# Patient Record
Sex: FEMALE | Race: WHITE | NOT HISPANIC OR LATINO | Employment: OTHER | URBAN - METROPOLITAN AREA
[De-identification: names, ages, dates, MRNs, and addresses within clinical notes are randomized per-mention and may not be internally consistent; named-entity substitution may affect disease eponyms.]

---

## 2017-03-29 ENCOUNTER — AMBULATORY - HEALTHEAST (OUTPATIENT)
Dept: PHYSICAL MEDICINE AND REHAB | Facility: CLINIC | Age: 67
End: 2017-03-29

## 2017-03-30 ENCOUNTER — RECORDS - HEALTHEAST (OUTPATIENT)
Dept: ADMINISTRATIVE | Facility: OTHER | Age: 67
End: 2017-03-30

## 2017-03-30 ENCOUNTER — AMBULATORY - HEALTHEAST (OUTPATIENT)
Dept: PHYSICAL MEDICINE AND REHAB | Facility: CLINIC | Age: 67
End: 2017-03-30

## 2017-03-30 DIAGNOSIS — M54.12 RADICULITIS, CERVICAL: ICD-10-CM

## 2017-03-30 DIAGNOSIS — M54.2 CERVICALGIA: ICD-10-CM

## 2017-03-30 DIAGNOSIS — M79.2 NEURITIS: ICD-10-CM

## 2017-04-07 ENCOUNTER — HOSPITAL ENCOUNTER (OUTPATIENT)
Dept: PHYSICAL MEDICINE AND REHAB | Facility: CLINIC | Age: 67
Discharge: HOME | End: 2017-04-07
Attending: ORTHOPAEDIC SURGERY

## 2017-04-07 ENCOUNTER — HOSPITAL ENCOUNTER (OUTPATIENT)
Dept: PHYSICAL MEDICINE AND REHAB | Facility: CLINIC | Age: 67
Discharge: HOME | End: 2017-04-07
Attending: PHYSICIAN ASSISTANT

## 2017-04-07 DIAGNOSIS — M54.2 CERVICALGIA: ICD-10-CM

## 2017-04-07 DIAGNOSIS — M48.02 CERVICAL STENOSIS OF SPINAL CANAL: ICD-10-CM

## 2017-04-07 DIAGNOSIS — M79.18 MYOFASCIAL PAIN: ICD-10-CM

## 2017-04-07 DIAGNOSIS — M54.12 CERVICAL RADICULITIS: ICD-10-CM

## 2017-04-07 DIAGNOSIS — Z98.1 STATUS POST CERVICAL SPINAL FUSION: ICD-10-CM

## 2017-04-07 DIAGNOSIS — M54.2 NECK PAIN: ICD-10-CM

## 2017-04-07 DIAGNOSIS — M50.20 CERVICAL DISC HERNIATION: ICD-10-CM

## 2017-04-07 RX ORDER — LIDOCAINE 50 MG/G
1-2 PATCH TOPICAL DAILY PRN
Status: SHIPPED | COMMUNITY
Start: 2017-04-07

## 2017-04-07 RX ORDER — DULOXETIN HYDROCHLORIDE 20 MG/1
20 CAPSULE, DELAYED RELEASE ORAL DAILY
Status: SHIPPED | COMMUNITY
Start: 2017-04-07

## 2017-04-07 ASSESSMENT — MIFFLIN-ST. JEOR: SCORE: 2042.35

## 2017-04-09 ENCOUNTER — ANESTHESIA - HEALTHEAST (OUTPATIENT)
Dept: SURGERY | Facility: HOSPITAL | Age: 67
End: 2017-04-09

## 2017-04-10 ENCOUNTER — AMBULATORY - HEALTHEAST (OUTPATIENT)
Dept: PHYSICAL MEDICINE AND REHAB | Facility: CLINIC | Age: 67
End: 2017-04-10

## 2017-04-10 ENCOUNTER — SURGERY - HEALTHEAST (OUTPATIENT)
Dept: SURGERY | Facility: HOSPITAL | Age: 67
End: 2017-04-10

## 2017-04-10 ASSESSMENT — MIFFLIN-ST. JEOR: SCORE: 2044.96

## 2017-04-28 ASSESSMENT — MIFFLIN-ST. JEOR: SCORE: 2012.07

## 2017-05-01 ENCOUNTER — SURGERY - HEALTHEAST (OUTPATIENT)
Dept: SURGERY | Facility: HOSPITAL | Age: 67
End: 2017-05-01

## 2017-05-01 ENCOUNTER — ANESTHESIA - HEALTHEAST (OUTPATIENT)
Dept: SURGERY | Facility: HOSPITAL | Age: 67
End: 2017-05-01

## 2017-05-02 ASSESSMENT — MIFFLIN-ST. JEOR: SCORE: 2004.14

## 2017-05-11 ENCOUNTER — COMMUNICATION - HEALTHEAST (OUTPATIENT)
Dept: PHYSICAL MEDICINE AND REHAB | Facility: CLINIC | Age: 67
End: 2017-05-11

## 2017-05-11 DIAGNOSIS — G95.9 CERVICAL MYELOPATHY (H): ICD-10-CM

## 2017-05-16 ENCOUNTER — HOSPITAL ENCOUNTER (OUTPATIENT)
Dept: RADIOLOGY | Facility: HOSPITAL | Age: 67
Discharge: HOME | End: 2017-05-16
Attending: PHYSICIAN ASSISTANT

## 2017-05-16 ENCOUNTER — HOSPITAL ENCOUNTER (OUTPATIENT)
Dept: PHYSICAL MEDICINE AND REHAB | Facility: CLINIC | Age: 67
Discharge: HOME | End: 2017-05-16
Attending: PHYSICIAN ASSISTANT

## 2017-05-16 DIAGNOSIS — G95.9 CERVICAL MYELOPATHY (H): ICD-10-CM

## 2017-05-18 ENCOUNTER — COMMUNICATION - HEALTHEAST (OUTPATIENT)
Dept: PHYSICAL MEDICINE AND REHAB | Facility: CLINIC | Age: 67
End: 2017-05-18

## 2017-05-19 ENCOUNTER — COMMUNICATION - HEALTHEAST (OUTPATIENT)
Dept: PHYSICAL MEDICINE AND REHAB | Facility: CLINIC | Age: 67
End: 2017-05-19

## 2017-05-19 DIAGNOSIS — Z98.1 S/P CERVICAL SPINAL FUSION: ICD-10-CM

## 2017-05-23 ENCOUNTER — COMMUNICATION - HEALTHEAST (OUTPATIENT)
Dept: PHYSICAL MEDICINE AND REHAB | Facility: CLINIC | Age: 67
End: 2017-05-23

## 2017-05-23 ENCOUNTER — AMBULATORY - HEALTHEAST (OUTPATIENT)
Dept: PHYSICAL MEDICINE AND REHAB | Facility: CLINIC | Age: 67
End: 2017-05-23

## 2017-05-25 ENCOUNTER — COMMUNICATION - HEALTHEAST (OUTPATIENT)
Dept: PHYSICAL MEDICINE AND REHAB | Facility: CLINIC | Age: 67
End: 2017-05-25

## 2017-05-25 DIAGNOSIS — G95.9 CERVICAL MYELOPATHY (H): ICD-10-CM

## 2017-06-01 ENCOUNTER — COMMUNICATION - HEALTHEAST (OUTPATIENT)
Dept: PHYSICAL MEDICINE AND REHAB | Facility: CLINIC | Age: 67
End: 2017-06-01

## 2017-06-01 DIAGNOSIS — G95.9 CERVICAL MYELOPATHY (H): ICD-10-CM

## 2017-06-13 ENCOUNTER — HOSPITAL ENCOUNTER (OUTPATIENT)
Dept: PHYSICAL MEDICINE AND REHAB | Facility: CLINIC | Age: 67
Discharge: HOME | End: 2017-06-13
Attending: PHYSICIAN ASSISTANT

## 2017-06-13 ENCOUNTER — HOSPITAL ENCOUNTER (OUTPATIENT)
Dept: RADIOLOGY | Facility: HOSPITAL | Age: 67
Discharge: HOME | End: 2017-06-13
Attending: PHYSICIAN ASSISTANT

## 2017-06-13 DIAGNOSIS — Z98.1 STATUS POST CERVICAL SPINAL FUSION: ICD-10-CM

## 2017-06-13 DIAGNOSIS — G95.9 CERVICAL MYELOPATHY (H): ICD-10-CM

## 2017-06-13 RX ORDER — HYDROXYZINE PAMOATE 50 MG/1
CAPSULE ORAL
Status: SHIPPED | COMMUNITY
Start: 2017-06-13

## 2017-06-13 RX ORDER — IBUPROFEN 200 MG
1 CAPSULE ORAL 2 TIMES DAILY
Qty: 200 TABLET | Refills: 4 | Status: SHIPPED | OUTPATIENT
Start: 2017-06-13

## 2017-06-13 RX ORDER — OXYCODONE HYDROCHLORIDE 5 MG/1
TABLET ORAL
Status: SHIPPED | COMMUNITY
Start: 2017-06-13

## 2017-06-13 RX ORDER — ERGOCALCIFEROL 1.25 MG/1
50000 CAPSULE ORAL WEEKLY
Qty: 12 CAPSULE | Refills: 4 | Status: SHIPPED | OUTPATIENT
Start: 2017-06-13

## 2017-06-13 ASSESSMENT — MIFFLIN-ST. JEOR: SCORE: 2022.28

## 2017-07-10 ENCOUNTER — COMMUNICATION - HEALTHEAST (OUTPATIENT)
Dept: PHYSICAL MEDICINE AND REHAB | Facility: CLINIC | Age: 67
End: 2017-07-10

## 2017-07-12 ENCOUNTER — AMBULATORY - HEALTHEAST (OUTPATIENT)
Dept: PHYSICAL MEDICINE AND REHAB | Facility: CLINIC | Age: 67
End: 2017-07-12

## 2017-07-12 DIAGNOSIS — G89.18 POST-OPERATIVE PAIN: ICD-10-CM

## 2017-07-25 ENCOUNTER — HOSPITAL ENCOUNTER (OUTPATIENT)
Dept: RADIOLOGY | Facility: HOSPITAL | Age: 67
Discharge: HOME | End: 2017-07-25
Attending: PHYSICIAN ASSISTANT

## 2017-07-25 ENCOUNTER — COMMUNICATION - HEALTHEAST (OUTPATIENT)
Dept: TELEHEALTH | Facility: CLINIC | Age: 67
End: 2017-07-25

## 2017-07-25 ENCOUNTER — HOSPITAL ENCOUNTER (OUTPATIENT)
Dept: PHYSICAL MEDICINE AND REHAB | Facility: CLINIC | Age: 67
Discharge: HOME | End: 2017-07-25
Attending: PHYSICIAN ASSISTANT

## 2017-07-25 DIAGNOSIS — Z98.1 S/P CERVICAL SPINAL FUSION: ICD-10-CM

## 2017-07-25 DIAGNOSIS — Z98.1 STATUS POST CERVICAL SPINAL FUSION: ICD-10-CM

## 2017-08-03 ENCOUNTER — COMMUNICATION - HEALTHEAST (OUTPATIENT)
Dept: PHYSICAL MEDICINE AND REHAB | Facility: CLINIC | Age: 67
End: 2017-08-03

## 2017-08-11 ENCOUNTER — COMMUNICATION - HEALTHEAST (OUTPATIENT)
Dept: PHYSICAL MEDICINE AND REHAB | Facility: CLINIC | Age: 67
End: 2017-08-11

## 2021-05-30 VITALS — WEIGHT: 293 LBS | HEIGHT: 67 IN | BODY MASS INDEX: 45.99 KG/M2

## 2021-05-30 VITALS — WEIGHT: 293 LBS | HEIGHT: 68 IN | BODY MASS INDEX: 44.41 KG/M2

## 2021-05-30 VITALS — BODY MASS INDEX: 45.99 KG/M2 | WEIGHT: 293 LBS | HEIGHT: 67 IN

## 2021-05-31 VITALS — HEIGHT: 67 IN | WEIGHT: 293 LBS | BODY MASS INDEX: 45.99 KG/M2
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
## 2021-06-09 NOTE — PROGRESS NOTES
[facility] SPINE SURGERY SERVICE CONSULTATION NOTE    4/7/2017     Erika Marroquin is a 66 y.o. female who presents today with neck pain and rates her pain a 6-8/10, driving and activites aggravates her symptoms, alleviating factors include rest, c collar and lidocaine patches. She uses a front wheeled walker daily for the past few months. She has had a RAY in her cervical spine which did not help and feels that her balance is off, has performed PT for her neck in the past.     REVIEW OF SYSTEMS:  ROS reviewed with pt as documented on pt health form of 4/7/2017.    Negative cardiac, pulmonary, hematological.  No family hx of anesthetic reactions.  No family hx of hypercoagulability. Endorses cardiac arrythmia, HTN.        MEDICATIONS:  Current Outpatient Prescriptions   Medication Sig Dispense Refill     CALCIUM-VITAMIN D3 ORAL Take by mouth daily.       celecoxib (CELEBREX) 100 MG capsule Take 100 mg by mouth 2 (two) times a day.       DULoxetine (CYMBALTA) 20 MG capsule Take 20 mg by mouth daily.       lidocaine (LIDODERM) 5 % Place 1 patch on the skin as needed. Remove & Discard patch within 12 hours or as directed by MD       lisinopril (PRINIVIL,ZESTRIL) 20 MG tablet Take 20 mg by mouth daily.       METOPROLOL SUCCINATE ORAL Take by mouth 2 (two) times a day.       METOPROLOL SUCCINATE ORAL Take by mouth as needed.       RABEprazole (ACIPHEX) 20 mg tablet Take 20 mg by mouth daily.       traMADol (ULTRAM) 50 mg tablet Take 50 mg by mouth 3 (three) times a day as needed for pain.       No current facility-administered medications for this encounter.          ALLERGIES/SENSITIVITIES:     Allergies   Allergen Reactions     Cortisone      Codeine Rash       PERTINENT SOCIAL HISTORY:   Social History     Social History     Marital status:      Spouse name: N/A     Number of children: N/A     Years of education: N/A     Social History Main Topics     Smoking status: Former Smoker     Smokeless tobacco: None      Alcohol use None     Drug use: None     Sexual activity: Not Asked     Other Topics Concern     None     Social History Narrative         FAMILY HISTORY:  No family history on file.     PHYSICAL EXAM:   Constitutional: There were no vitals taken for this visit.     Mental Status: A & O in no acute distress.  Affect is appropriate.  Speech is fluent.  Recent and remote memory are intact.  Attention span and concentration are normal.     Cranial Nerves: Grossly intact    Range of Motion:limited due to pain     Provacative Testing: postive      Motor: No pronator drift of upper extremity. Normal bulk and tone all muscle groups of upper and lower extremities. 5/5     Sensory: Sensation intact bilaterally to light touch. Altered sensation in a bilateral upper extremities in C7 distribution      Coordination: unable to perform Heel/toe/  Gait.      tandem gait difficult.      Reflexes; supinator, biceps, triceps, knee/ ankle jerk intact. 2/4    IMAGING: I personally reviewed all radiographic images    MRI  CONCLUSION: Moderate to advanced multilevel degenerative disc disease with mild reversal of the normal cervical lordosis and mild levoscoliosis with specific impressions as follows:  1. Developmental narrowing of the cervical spinal canal on the basis of short pedicles with severe spinal canal stenosis at C3-C4 and C6-C7, moderate spinal canal stenosis at C4-C5 and C5-C6, and mild spinal canal stenosis at C2-C3.  2. Impingement of the bilateral C4 nerve roots, right C5 nerve root, left C6 nerve root, and left C7 nerve root.  3. Advanced degenerative facet arthropathy on the right at C3-C4 and C4-C5, and mild degenerative facet arthropathy on the left at C7-T1.  4. Left posterolateral disc protrusion at C6-C7 indents the ventral left hemicord and left posterolateral disc osteophyte complex at C5-C6 flattens the ventral left hemicord without definite cord signal abnormality.  5. There is no evidence of fracture,  infection or tumor.          CONSULTATION ASSESSMENT AND PLAN:    This is a 66-year-old female with progressive cervical spondylitic myelopathy.  She has become walker dependent in the last several months.  She has significant neck pain and upper extremity radiculopathy.  She has 3 level moderate to severe cervical stenosis with cord compression C3-C7.  She was referred for potential surgical consultation.  On today's visit we discussed a staged reconstruction of her cervical spine.  Anterior posterior approach would be taken because of the multilevel nature of this procedure to decrease potential for pseudoarthrosis.  This would be staged because of the extensive nature of the anterior procedure required as well as the patient's morbid obesity and BMI of 50 which could increase complication risk as far as bleeding and swelling.  Stage I would involve anterior cervical decompression and fusion C3-C7 with partial inferior corpectomies as needed.  Risks and benefits were discussed.We discussed risks of the anterior cervical fusion including but not limited to spinal cord injury, nerve root damage, spinal fluid leakage, infection, bleeding, recurrent laryngeal nerve, soft tissue injury, difficulty swallowing, chronic pain syndromes, and other unforeseen complications.  We discussed a stage II procedure involving posterior cervical fusion C3-C7 2-3 weeks later.We discussed risks of posterior cervical fusion including but not limited to spinal cord injury, nerve root damage, spinal fluid leakage, infection, bleeding, difficulty swallowing, chronic pain syndromes, loss of range of motion, and other unforeseen complications.  She did wish to proceed with operative management and we'll get her scheduled as soon as possible for the anterior cervical fusion to decompress her cervical cord.  If he has any other questions or concerns he certainly not hesitate to contact our surgical team and the [facility] at any  time.  Patient was specifically instructed on pre-operative and scheduling protocol and was given the appropriate contact information for the surgery scheduler and the general information line.    Surgical plan:  1.  Stage I anterior cervical decompression and fusion C3-C7 with partial inferior corpectomies as needed  2.  Stage II posterior cervical fusion C3-C7 with instrumentation    Jorge Meyer MD  Spine Surgeon  [facility]    Cc:   Opal Mcgarry MD  [address] [place] [id number]

## 2021-06-09 NOTE — ANESTHESIA PREPROCEDURE EVALUATION
Anesthesia Evaluation      Patient summary reviewed   History of anesthetic complications (ponv)     Airway   Mallampati: II  Comment: Neck brace in place   Pulmonary - negative ROS and normal exam   (+) asthma  mild,  well controlled,     ROS comment: Elevated right hemidiaphram                         Cardiovascular   Exercise tolerance: > or = 4 METS  (+) , hypercholesterolemia,     (-) past MI, anginaDysrhythmias: history of rapid heart rate.  Rhythm: regular  Rate: normal,         Neuro/Psych      Endo/Other    (+) obesity,      GI/Hepatic/Renal    (+) GERD poorly controlled,        Other findings: Breast cancer with LN dissection on the left - avoid IV BP on the left Left lower incisor chip      Dental    (+) chipped                       Anesthesia Plan  Planned anesthetic: general endotracheal  Soft bite block  Scopolamine  Low dose Propofol infusion background for PONV  Decadron/Zofran  Pre op nebulizer/albuterol  Consider methadone 20mg with induction  Glidescope/neck in neutral position okay with surgeon  RSI or modified RSI (she says GERD is not well controlled with her meds)  Precedex infusion  A line keep MAP>90 (requested by surgeon)  ASA 3   Induction: intravenous   Anesthetic plan and risks discussed with: patient and spouse  Anesthesia plan special considerations: video-assisted, rapid sequence induction, arterial catheterization, IV therapy two IVs,   Post-op plan: routine recovery

## 2021-06-09 NOTE — ANESTHESIA PROCEDURE NOTES
Arterial Line  Reason for Procedure: hemodynamic monitoring  Patient location during procedure: Pre-op  Start time: 4/10/2017 9:07 AM  End time: 4/10/2017 9:12 AM  Staffing:  Performing  Anesthesiologist: LUIS ANGEL BOSCH  Sterile Precautions:  sterile barriers used during insertion: cap, mask, sterile gloves, large sheet, and hand hygiene used.  Arterial Line:   Immediately prior to procedure a time out was called to verify the correct patient, procedure, equipment, support staff and site/side marked as required  Laterality: right  Location: radial  Prepped with: ChloroPrep    Needle gauge: 20 G  Number of Attempts: 1  Secured with: tape and transparent dressing  Flushed with: saline  1% lidocaine local anesthesia used for skin prep.   See MAR for additional medications given.

## 2021-06-10 NOTE — ANESTHESIA PREPROCEDURE EVALUATION
Anesthesia Evaluation      History of anesthetic complications (PONV)     Airway   Mallampati: III  Neck ROM: limited   Pulmonary - normal exam   (+) asthma  mild,  well controlled,                          Cardiovascular - normal exam  (+) hypertension, dysrhythmias (H/O palpitations.), ,      Neuro/Psych    (+) neuromuscular disease (Cervical myelopathy.),      Endo/Other    (+) obesity,      GI/Hepatic/Renal    (+) GERD intermittent,        Other findings: Diaphragm paralysis.  Spinal cord stimulator implant.      Dental - normal exam                        Anesthesia Plan  Planned anesthetic: general endotracheal    ASA 3   Induction: intravenous   Anesthetic plan and risks discussed with: patient and spouse  Anesthesia plan special considerations: video-assisted, rapid sequence induction, increased risk of difficult airway,   Post-op plan: routine recovery

## 2021-06-10 NOTE — ANESTHESIA POSTPROCEDURE EVALUATION
Patient: Erika Marroquin  ANTERIOR CERVICAL DECOMPRESSION/FUSION C3-7 BILATERAL INFERIOR CORPECTOMY C3, C4, C5, C6  Anesthesia type: general    Patient location: PACU  Last vitals:   Vitals:    04/10/17 1400   BP:    Pulse: 71   Resp: 16   Temp:    SpO2: 93%     Post vital signs: stable  Level of consciousness: awake and responds to simple questions  Post-anesthesia pain: pain controlled  Post-anesthesia nausea and vomiting: no  Pulmonary: unassisted, return to baseline  Cardiovascular: stable and blood pressure at baseline  Hydration: adequate  Anesthetic events: no    QCDR Measures:  ASA# 11 - Maria Luz-op Cardiac Arrest: ASA11B - Patient did NOT experience unanticipated cardiac arrest  ASA# 12 - Maria Luz-op Mortality Rate: ASA12B - Patient did NOT die  ASA# 13 - PACU Re-Intubation Rate: ASA13B - Patient did NOT require a new airway mgmt  ASA# 10 - Composite Anes Safety: ASA10A - No serious adverse event  ASA# 38 - New Corneal Injury: ASA38A - No new exposure keratitis or corneal abrasion in PACU    Additional Notes:

## 2021-06-10 NOTE — PROGRESS NOTES
History:    Ms Marroquin is 2 weeks post PCF C3-7 and 4 weeks post ACDF C3-7 for cervical myelopathy.  She has noted great improvement in her upper extremity strength and coordination.  She is still with subjective weakness in her lower extremities.  She feels well her pain is controlled.  She denies any wound drainage or fevers.    Exam:  Alter and oriented x 3.  vss afebrile    Incision: clean/dry/intact without evidence of infection.  Within the skin fold of her posterior cervical wound there is some eschar tissue with slough no drainage erythema or swelling.    Neuro:     Motors  5/5 throughout w/o deficit    Sensory  Equal normal bilaterally    Imaginv cervical xrays: Hardware is intact and stable no evidence of motion or migration    A/P  2-4 weeks post S/p AP C3-7. Cervical myelopathy.  -I discussed with Ms. Marroquin that her pain should continue to improve.  Her wound appears to be healing well minimal eschar tissue posteriorly we will keep a close eye on I will prescribe her Keflex and Cipro prophylactically she will continue to keep it clean and dry should she develop any fevers increased pain and drainage she will not hesitate to let me know.  We will see her back at her next scheduled visit in 4 weeks with updated x-rays.    Should the patient have any future problems/questions/concerns they will let me know.

## 2021-06-10 NOTE — ANESTHESIA POSTPROCEDURE EVALUATION
Patient: Erika Marroquin  POSTERIOR CERVICAL FUSION C3-7 BILATERAL  Anesthesia type: general    Patient location: PACU  Last vitals:   Vitals:    05/01/17 1410   BP: 154/78   Pulse: 79   Resp: 15   Temp:    SpO2: 94%     Post vital signs: stable  Level of consciousness: awake and responds to simple questions  Post-anesthesia pain: pain controlled  Post-anesthesia nausea and vomiting: no  Pulmonary: unassisted, return to baseline  Cardiovascular: stable and blood pressure at baseline  Hydration: adequate  Anesthetic events: no    QCDR Measures:  ASA# 11 - Maria Luz-op Cardiac Arrest: ASA11B - Patient did NOT experience unanticipated cardiac arrest  ASA# 12 - Maria Luz-op Mortality Rate: ASA12B - Patient did NOT die  ASA# 13 - PACU Re-Intubation Rate: ASA13B - Patient did NOT require a new airway mgmt  ASA# 10 - Composite Anes Safety: ASA10A - No serious adverse event  ASA# 38 - New Corneal Injury: ASA38A - No new exposure keratitis or corneal abrasion in PACU    Additional Notes:

## 2021-06-10 NOTE — ANESTHESIA CARE TRANSFER NOTE
Last vitals:   Vitals:    04/10/17 1317   BP: 132/71   Pulse: 84   Resp: 12   Temp: 36.2  C (97.2  F)   SpO2: 92%     Patient's level of consciousness is drowsy  Spontaneous respirations: yes  Maintains airway independently: yes  Dentition unchanged: yes  Oropharynx: oropharynx clear of all foreign objects    QCDR Measures:  ASA# 20 - Surgical Safety Checklist: ASA20A - Safety Checks Done  PQRS# 430 - Adult PONV Prevention: 4558F - Pt received => 2 anti-emetic agents (different classes) preop & intraop  ASA# 8 - Peds PONV Prevention: NA - Not pediatric patient, not GA or 2 or more risk factors NOT present  PQRS# 424 - Maria Luz-op Temp Management: 4559F - At least one body temp DOCUMENTED => 35.5C or 95.9F within required timeframe  PQRS# 426 - PACU Transfer Protocol: - Transfer of care checklist used  ASA# 14 - Acute Post-op Pain: ASA14B - Patient did NOT experience pain >= 7 out of 10    I completed my SBAR handoff to the receiving nurse per policy and procedure.

## 2021-06-10 NOTE — ANESTHESIA CARE TRANSFER NOTE
Last vitals:   Vitals:    05/01/17 1240   BP: (!) 141/93   Pulse: 78   Resp: 16   Temp: (!) 35.7  C (96.2  F)   SpO2: 98%     Patient's level of consciousness is drowsy  Spontaneous respirations: yes  Maintains airway independently: yes  Dentition unchanged: yes  Oropharynx: oropharynx clear of all foreign objects    QCDR Measures:  ASA# 20 - Surgical Safety Checklist: ASA20A - Safety Checks Done  PQRS# 430 - Adult PONV Prevention: 4558F - Pt received => 2 anti-emetic agents (different classes) preop & intraop  ASA# 8 - Peds PONV Prevention: NA - Not pediatric patient, not GA or 2 or more risk factors NOT present  PQRS# 424 - Maria Luz-op Temp Management: 4559F - At least one body temp DOCUMENTED => 35.5C or 95.9F within required timeframe  PQRS# 426 - PACU Transfer Protocol: - Transfer of care checklist used  ASA# 14 - Acute Post-op Pain: ASA14B - Patient did NOT experience pain >= 7 out of 10    I completed my SBAR handoff to the receiving nurse per policy and procedure.

## 2021-06-11 NOTE — PROGRESS NOTES
History:    Ms Marroquin is 6 weeks post PCF C3-7 and 8 weeks post ACDF C3-7 for cervical myelopathy.  She has noted great improvement in her upper extremity strength and coordination.  She is still with subjective weakness in her lower extremities.  She has had various aches and pains since surgery but she attributes it to discontinuing her Celebrex.  She states the pain in her neck is fairly well controlled. No fevers     Exam:  Alter and oriented x 3.  vss afebrile     Incision: clean/dry/intact without evidence of infection.  Within the skin fold of her posterior cervical wound is healing nicely there was some dehiscence there is good healthy tissue present at this point no erythema or drainage  Neuro:      Motors  5/5 throughout w/o deficit     Sensory  Equal normal bilaterally     Imaginv cervical xrays: Hardware is intact and stable no evidence of motion or migration     A/P  6/8 weeks post S/p staged AP C3-7. Cervical myelopathy.  -Her wound appears to be healing well.  She will continue with her therapies I will see her back in 6 more weeks for her 3 month postoperative visit.

## 2021-06-12 NOTE — PROGRESS NOTES
History:      Ms Marroquin is <3 months post PCF C3-7 and 4 months post ACDF C3-7 for cervical myelopathy.  Her pain is controlled. She has restarted gardening and overall feels well.      Exam:  Alter and oriented x 3.  vss afebrile    Incision: clean/dry/intact without evidence of infection    Neuro:     Motors  5/5 throughout w/o deficit    Sensory  Equal normal bilaterally    Imaginv cervical xray: hdw intact/stable no changes        A/P  S/p AP C3-7 for cervical myelopathy. Cervicalgia  -Ms Marroquin will increase her activities as she can quan. F/u in 3 months. She will continue with her bgs.  Should the patient have any future problems/questions/concerns they will let me know.

## 2021-06-15 PROBLEM — G95.9 CERVICAL MYELOPATHY (H): Status: ACTIVE | Noted: 2017-04-10

## 2021-07-03 NOTE — ADDENDUM NOTE
Addendum Note by Ovi Zhou PA-C at 4/7/2017  1:08 PM     Author: Ovi Zhou PA-C Service: -- Author Type: Physician Assistant    Filed: 5/23/2017 11:40 AM Date of Service: 4/7/2017  1:08 PM Status: Signed    : Ovi Zhou PA-C (Physician Assistant)    Encounter addended by: Ovi Zhou PA-C on: 5/23/2017 11:40 AM<BR>     Actions taken: Sign clinical note

## 2023-06-26 ENCOUNTER — TRANSCRIBE ORDERS (OUTPATIENT)
Dept: OTHER | Age: 73
End: 2023-06-26

## 2023-06-26 DIAGNOSIS — J98.4 RESTRICTIVE LUNG DISEASE: ICD-10-CM

## 2023-06-26 DIAGNOSIS — J98.6 DISORDER OF DIAPHRAGM: Primary | ICD-10-CM

## 2023-06-27 DIAGNOSIS — J98.4 RESTRICTIVE LUNG DISEASE: Primary | ICD-10-CM

## 2023-06-28 ENCOUNTER — TELEPHONE (OUTPATIENT)
Dept: PULMONOLOGY | Facility: CLINIC | Age: 73
End: 2023-06-28
Payer: OTHER GOVERNMENT

## 2023-06-28 NOTE — TELEPHONE ENCOUNTER
Left Voicemail (1st Attempt) for the patient to call back and schedule the following:    Appointment type: New   Provider: Hussein   Return date: 10/04/2023  Specialty phone number: [phone]  Additional appointment(s) needed: CXR, PFT  Additonal Notes: LVM to schedule CXR with 10/04/2023 appointment with Dr. Savage

## 2023-07-26 NOTE — CONFIDENTIAL NOTE
RECORDS RECEIVED FROM: internal    DATE RECEIVED: 10.4.23   NOTES STATUS DETAILS   OFFICE NOTE from referring provider Internal  PANCHITO GUERRERO    OFFICE NOTE from other specialist     DISCHARGE SUMMARY from hospital     DISCHARGE REPORT from the ER     MEDICATION LIST internal     IMAGING  (NEED IMAGES AND REPORTS)     CT SCAN     CHEST XRAY (CXR) internal  Scheduled 10.4.23   TESTS     PULMONARY FUNCTION TESTING (PFT) internal  Scheduled 10.4.23

## 2023-10-03 ENCOUNTER — ANCILLARY PROCEDURE (OUTPATIENT)
Dept: GENERAL RADIOLOGY | Facility: CLINIC | Age: 73
End: 2023-10-03
Attending: STUDENT IN AN ORGANIZED HEALTH CARE EDUCATION/TRAINING PROGRAM
Payer: MEDICARE

## 2023-10-03 DIAGNOSIS — J98.4 RESTRICTIVE LUNG DISEASE: ICD-10-CM

## 2023-10-03 PROCEDURE — 71046 X-RAY EXAM CHEST 2 VIEWS: CPT | Performed by: RADIOLOGY

## 2023-10-04 ENCOUNTER — OFFICE VISIT (OUTPATIENT)
Dept: PULMONOLOGY | Facility: CLINIC | Age: 73
End: 2023-10-04
Attending: STUDENT IN AN ORGANIZED HEALTH CARE EDUCATION/TRAINING PROGRAM
Payer: MEDICARE

## 2023-10-04 ENCOUNTER — TELEPHONE (OUTPATIENT)
Dept: PULMONOLOGY | Facility: CLINIC | Age: 73
End: 2023-10-04

## 2023-10-04 ENCOUNTER — PRE VISIT (OUTPATIENT)
Dept: PULMONOLOGY | Facility: CLINIC | Age: 73
End: 2023-10-04

## 2023-10-04 VITALS
SYSTOLIC BLOOD PRESSURE: 161 MMHG | OXYGEN SATURATION: 95 % | HEART RATE: 70 BPM | BODY MASS INDEX: 44.41 KG/M2 | RESPIRATION RATE: 17 BRPM | DIASTOLIC BLOOD PRESSURE: 94 MMHG | HEIGHT: 68 IN | WEIGHT: 293 LBS

## 2023-10-04 DIAGNOSIS — J98.4 RESTRICTIVE LUNG DISEASE: ICD-10-CM

## 2023-10-04 DIAGNOSIS — J98.6 DISORDER OF DIAPHRAGM: ICD-10-CM

## 2023-10-04 PROCEDURE — 99213 OFFICE O/P EST LOW 20 MIN: CPT | Performed by: STUDENT IN AN ORGANIZED HEALTH CARE EDUCATION/TRAINING PROGRAM

## 2023-10-04 PROCEDURE — 94060 EVALUATION OF WHEEZING: CPT | Performed by: INTERNAL MEDICINE

## 2023-10-04 PROCEDURE — 94726 PLETHYSMOGRAPHY LUNG VOLUMES: CPT | Performed by: INTERNAL MEDICINE

## 2023-10-04 PROCEDURE — 99205 OFFICE O/P NEW HI 60 MIN: CPT | Mod: 25 | Performed by: STUDENT IN AN ORGANIZED HEALTH CARE EDUCATION/TRAINING PROGRAM

## 2023-10-04 PROCEDURE — 94729 DIFFUSING CAPACITY: CPT | Performed by: INTERNAL MEDICINE

## 2023-10-04 PROCEDURE — G0463 HOSPITAL OUTPT CLINIC VISIT: HCPCS | Performed by: STUDENT IN AN ORGANIZED HEALTH CARE EDUCATION/TRAINING PROGRAM

## 2023-10-04 RX ORDER — FLUTICASONE PROPIONATE 50 MCG
1 SPRAY, SUSPENSION (ML) NASAL DAILY
COMMUNITY

## 2023-10-04 RX ORDER — ACETAMINOPHEN 500 MG
500 TABLET ORAL
COMMUNITY

## 2023-10-04 RX ORDER — OMEPRAZOLE 40 MG/1
40 CAPSULE, DELAYED RELEASE ORAL DAILY
COMMUNITY

## 2023-10-04 RX ORDER — CLOBETASOL PROPIONATE 0.5 MG/G
CREAM TOPICAL 2 TIMES DAILY
COMMUNITY

## 2023-10-04 RX ORDER — FUROSEMIDE 20 MG
20 TABLET ORAL DAILY
COMMUNITY

## 2023-10-04 RX ORDER — CETIRIZINE HYDROCHLORIDE 10 MG/1
10 TABLET ORAL DAILY
COMMUNITY

## 2023-10-04 RX ORDER — FAMOTIDINE 20 MG/1
20 TABLET, FILM COATED ORAL 2 TIMES DAILY
COMMUNITY

## 2023-10-04 RX ORDER — CELECOXIB 200 MG/1
200 CAPSULE ORAL DAILY
COMMUNITY

## 2023-10-04 ASSESSMENT — PAIN SCALES - GENERAL: PAINLEVEL: NO PAIN (0)

## 2023-10-04 NOTE — PATIENT INSTRUCTIONS
It was nice to meet you today!    Further testing to do:  - seated and supine pulmonary function testing  - repeat sleep study  - 6-minute walk test  - pulmonary rehab (we will look into chest wall rehab as well)    Vaccines as able:  - COVID booster, annual flu, RSV, PCV20    Come back to see me in 3 months, and we will discuss the findings of above.

## 2023-10-04 NOTE — TELEPHONE ENCOUNTER
PFT and 6MWT orders faxed to [facility] in [place] per patient's request. Fax # [phone]

## 2023-10-04 NOTE — NURSING NOTE
Chief Complaint   Patient presents with    Consult     New disorder of diaphragm, restrictive lung disease     Medications reviewed and vital signs taken.   Mariah Padilla, CMA

## 2023-10-04 NOTE — LETTER
10/4/2023         RE: Erika Marroquin  [address]  [facility] [address]        Dear Colleague,    Thank you for referring your patient, Erika Marroquin, to the [facility] [place]. Please see a copy of my visit note below.    [facility]    Name: Erika Marroquin MRN: [id number]     Age: 72 year old   YOB: 1950     Reason for Visit: restrictive lung disease          HPI:   Erika Marroquin is a 72 year old female who presents to clinic for second opinion.  She has followed with a pulmonologist closer to home, but is here for evaluation.    Overall, she reports a few concerns.  Since 2021 primarily, she feels her breathing is gotten worse.  In addition to difficulty breathing, she reports significant fatigue and inability to do the things that she used to be able to do.  She has significant shortness of breath with exertion, but also sometimes will feel short of breath just sitting still.  Her shortness of breath gets worse when she tries to lift things overhead, when she lays down flat at night, sometimes when she bends over.    She also has some cough.  She has chronic pain, has undergone multiple spinal surgeries, spinal stimulators, is on chronic opioids.  She takes about 2 doses of oxycodone daily.  She has a history of breast cancer and underwent surgical mastectomy, no other therapies.  She had bilateral breast implants but after multiple failed implants she ultimately had them removed.    10 point ROS performed and negative except for as noted in HPI.         Social History:   Tobacco: Prior, quit 1986. no inhaled substances now.  Occupation: Used to work as a respiratory therapist         Past Medical History:   Reviewed in EMR.  Multiple MSK surgeries.  Tobacco use history.  Breast cancer status post mastectomy.         Past Surgical History:      Past Surgical History:   Procedure Laterality Date    BACK  SURGERY      BREAST SURGERY      CERVICAL FUSION N/A 4/10/2017    Procedure: ANTERIOR CERVICAL DECOMPRESSION/FUSION C3-7 BILATERAL INFERIOR CORPECTOMY C3, C4, C5, C6;  Surgeon: Jorge Meyer MD;  Location: [facility];  Service:     CERVICAL FUSION N/A 5/1/2017    Procedure: POSTERIOR CERVICAL FUSION C3-7 BILATERAL;  Surgeon: Jorge Meyer MD;  Location: [facility];  Service:     CHOLECYSTECTOMY      JOINT REPLACEMENT      MASTECTOMY      OTHER SURGICAL HISTORY Bilateral     bilateral breast implants    OTHER SURGICAL HISTORY Right     right hip replacement and revision    OTHER SURGICAL HISTORY Bilateral     triggor fingerthumbs    RELEASE CARPAL TUNNEL Bilateral     SHOULDER ARTHROSCOPY W/ ROTATOR CUFF REPAIR Bilateral     SPINAL CORD STIMULATOR IMPLANT N/A     TOTAL KNEE ARTHROPLASTY             Family History:     Family History   Problem Relation Age of Onset    Pulmonary Embolism Father     Cerebrovascular Disease Father     Hypertension Father     Heart Disease Father     Pulmonary Embolism Son     Other - See Comments Mother         blood dyscrasia    Cerebrovascular Disease Mother     Leukemia Brother     Colitis Brother     Glaucoma Brother            Allergies:     Allergies   Allergen Reactions    No Clinical Screening - See Comments Rash     All types of metal- wristwatches and eyeglasses.  Pt reports NO PROBLEMS with previous knee and hip replacements.    Amoxicillin-Pot Clavulanate Nausea    Chromium Other (See Comments)    Cortisone Unknown     Heart palpitations, flu-like symptoms - only happens with injection    Flurbiprofen Nausea    Gabapentin Other (See Comments)     dizziness    Iodine Other (See Comments)    Adhesive Tape Other (See Comments) and Rash    Cetirizine Nausea and Vomiting    Codeine Rash     Tolerates Dilaudid    Methocarbamol Other (See Comments)     Severe headache    Other Drug Allergy (See Comments) Other (See Comments)     Does not remember what  reaction was    Salsalate Other (See Comments)     High doses causes ear ringing          Medications:   acetaminophen (TYLENOL) 500 MG tablet, Take 500 mg by mouth  albuterol (PROAIR HFA;PROVENTIL HFA;VENTOLIN HFA) 90 mcg/actuation inhaler, [ALBUTEROL (PROAIR HFA;PROVENTIL HFA;VENTOLIN HFA) 90 MCG/ACTUATION INHALER] Inhale 2 puffs every 6 (six) hours as needed for wheezing.  calcium citrate (CALCITRATE) 200 mg (950 mg) tablet, [CALCIUM CITRATE (CALCITRATE) 200 MG (950 MG) TABLET] Take 1 tablet (200 mg total) by mouth 2 (two) times a day.  calcium-vitamin D 250-100 mg-unit per tablet, [CALCIUM-VITAMIN D 250-100 MG-UNIT PER TABLET] Take 1 tablet by mouth 2 (two) times a day.  collagenase ointment, [COLLAGENASE OINTMENT] Apply topically.  DULoxetine (CYMBALTA) 20 MG capsule, [DULOXETINE (CYMBALTA) 20 MG CAPSULE] Take 20 mg by mouth daily.  ergocalciferol (VITAMIN D2) 50,000 unit capsule, [ERGOCALCIFEROL (VITAMIN D2) 50,000 UNIT CAPSULE] Take 1 capsule (50,000 Units total) by mouth once a week.  fexofenadine (ALLEGRA) 180 MG tablet, [FEXOFENADINE (ALLEGRA) 180 MG TABLET] Take 180 mg by mouth daily as needed.  hydrOXYzine (VISTARIL) 50 MG capsule, [HYDROXYZINE (VISTARIL) 50 MG CAPSULE] Take by mouth.  lidocaine (LIDODERM) 5 %, [LIDOCAINE (LIDODERM) 5 %] Place 1-2 patches on the skin daily as needed. Remove & Discard patch within 12 hours or as directed by MD   metoprolol tartrate (LOPRESSOR) 25 MG tablet, [METOPROLOL TARTRATE (LOPRESSOR) 25 MG TABLET] Take 25 mg by mouth 2 (two) times a day.  metoprolol tartrate (LOPRESSOR) 25 MG tablet, [METOPROLOL TARTRATE (LOPRESSOR) 25 MG TABLET] Take 25 mg by mouth daily as needed (for rapid heart rate).  oxyCODONE (ROXICODONE) 5 MG immediate release tablet, [OXYCODONE (ROXICODONE) 5 MG IMMEDIATE RELEASE TABLET] Take by mouth.  ranitidine (ZANTAC) 150 MG tablet, [RANITIDINE (ZANTAC) 150 MG TABLET] Take 150 mg by mouth daily as needed for heartburn.  triamcinolone (KENALOG) 0.1 %  "cream, [TRIAMCINOLONE (KENALOG) 0.1 % CREAM] Apply topically 2 (two) times a day as needed.  UNABLE TO FIND, [UNABLE TO FIND] Administer 1 drop to both eyes as needed (itchy eyes). Med Name:  Similasan Allergy Eye Drop, this is the brand that works for patient    No current facility-administered medications on file prior to visit.         Exam:   Pulse 70   Resp 17   Ht 1.727 m (5' 8\")   Wt 139.3 kg (307 lb)   SpO2 95%   BMI 46.68 kg/m      Gen: sitting upright, in no distress  HEENT: atraumatic, EOMI  Oropharynx:  CV: RRR, no peripheral edema noted  Resp: no increased WOB, breath sounds present bilaterally throughout all lung fields, no wheezing  Abd: not distended, non-tender  Skin: no rashes or lesions on exposed skin  Extremities: moving all extremities, no gross deformities  Neuro: alert and oriented no FND         Data:   Labs: bicarb 35 on CMP 5/3/23.    CXR 10/3/23 IMPRESSION: Elevated right hemidiaphragm with mild right basilar atelectasis.    CT chest 8/13/21 IMPRESSION:   1. No acute findings in the chest.   2. Incidental finding of 1.7 cm indeterminate lesion off the right upper kidney.  May represent a complex or proteinaceous cyst however a solid lesion is not excluded.  Further evaluation with renal ultrasound is recommended.   3. Sub 6 mm pulmonary nodules.  Follow-up can be obtained per Fleischner society guidelines provided below.   4. Post cholecystectomy.   5. Ruptured left breast implant.     PFTs 10/4/23:      ECHO 4/26/2022 Summary:    * The estimated ejection fraction is 50-55%.     * Normal right ventricular systolic function.     * There is no aortic stenosis with a peak velocity of  142 cm/s.     * The IVC is normal in size (< 2.1 cm), < 50% respiratory variance, RA pressure elevated at 8 mmHg.     Sniff 3/27/2021 in care everywhere- R diaphragm paralysis         Assessment and Recommendations:   Chronic hypoxemic respiratory insufficiency, 2L during day and night  Restrictive lung " disease  R paralyzed diaphragm  Breast cancer s/p mastectomy, breast implants, now removed  Obesity, BMI 47  Multiple spinal surgeries with hardware, spinal stimulator  Hx tobacco use, quit 1986    Ms. Marroquin is a 72-year-old woman who presents for evaluation of dyspnea, chronic oxygen needs.  Her symptoms are almost certainly multifactorial.  She has moderate restriction on pulmonary function test.  She has known right paralyzed diaphragm since 2012, and has worsening dyspnea when laying flat and bending over which is consistent with this.  We will pursue seated and supine PFTs for evaluation.  At this point, do not recommend surgical evaluation for plication, will address other factors first.  She has had multiple spinal surgeries and hardware, likely contributing.  She also has a history of breast cancer and had double mastectomy with implants, has had leakage of implants and feels a band around her chest which may also be contributing to decreased chest wall motion.  We will consider focused physical therapy to address this.    Her generalized fatigue is likely multifactorial as well, high concern that sleep disordered breathing may be contributing and despite a sleep study in the past she only had 51 minutes of sleep during that study so was not adequate.  She should repeat this.  Pending results of the sleep study, we could consider a morning ABG to qualify for overnight ventilatory support in the setting of restrictive lung disease.    --Seated and supine PFTs  -- 6-minute walk test to assess oxygen needs  -- Referral to pulmonary rehab  --Consider referral to chest wall focused physical therapy  --Encouraged ongoing weight loss and increased exercise  --Repeat sleep study, if no evidence of sleep apnea/need for BiPAP, would consider a.m. ABG and possible ventilatory support for restrictive lung disease    Patient staffed with Dr. Cody. Return to clinic in 3 months    Rosana Savage, DO  Pulmonary and  Critical Care Fellow    Attestation signed by Sakshi Cody MD at 10/5/2023 12:41 PM:  Physician Attestation  I, Sakshi Cody MD, saw this patient and agree with the findings and plan of care as documented in the note.      Items personally reviewed/procedural attestation: imaging and agree with the interpretation documented in the note and spirometry report and agree with the interpretation documented in the note. Message sent regarding specific community based physical therapists who work closely with restrictive chest wall pulmonary rehab/therapy with relation to band like feeling likely related to mastectomy. Unclear if this is truly contributing to dyspnea.     Sakshi Cody MD      Again, thank you for allowing me to participate in the care of your patient.      Sincerely,    Rosana Savage MD

## 2023-10-04 NOTE — PROGRESS NOTES
[facility]    Name: Erika Marroquin MRN: [id number]     Age: 72 year old   YOB: 1950     Reason for Visit: restrictive lung disease          HPI:   Erika Marroquin is a 72 year old female who presents to clinic for second opinion.  She has followed with a pulmonologist closer to home, but is here for evaluation.    Overall, she reports a few concerns.  Since 2021 primarily, she feels her breathing is gotten worse.  In addition to difficulty breathing, she reports significant fatigue and inability to do the things that she used to be able to do.  She has significant shortness of breath with exertion, but also sometimes will feel short of breath just sitting still.  Her shortness of breath gets worse when she tries to lift things overhead, when she lays down flat at night, sometimes when she bends over.    She also has some cough.  She has chronic pain, has undergone multiple spinal surgeries, spinal stimulators, is on chronic opioids.  She takes about 2 doses of oxycodone daily.  She has a history of breast cancer and underwent surgical mastectomy, no other therapies.  She had bilateral breast implants but after multiple failed implants she ultimately had them removed.    10 point ROS performed and negative except for as noted in HPI.         Social History:   Tobacco: Prior, quit 1986. no inhaled substances now.  Occupation: Used to work as a respiratory therapist         Past Medical History:   Reviewed in EMR.  Multiple MSK surgeries.  Tobacco use history.  Breast cancer status post mastectomy.         Past Surgical History:      Past Surgical History:   Procedure Laterality Date    BACK SURGERY      BREAST SURGERY      CERVICAL FUSION N/A 4/10/2017    Procedure: ANTERIOR CERVICAL DECOMPRESSION/FUSION C3-7 BILATERAL INFERIOR CORPECTOMY C3, C4, C5, C6;  Surgeon: Jorge Meyer MD;  Location: [facility];  Service:     CERVICAL FUSION N/A 5/1/2017     Procedure: POSTERIOR CERVICAL FUSION C3-7 BILATERAL;  Surgeon: Jorge Meyer MD;  Location: [facility] OR;  Service:     CHOLECYSTECTOMY      JOINT REPLACEMENT      MASTECTOMY      OTHER SURGICAL HISTORY Bilateral     bilateral breast implants    OTHER SURGICAL HISTORY Right     right hip replacement and revision    OTHER SURGICAL HISTORY Bilateral     triggor fingerthumbs    RELEASE CARPAL TUNNEL Bilateral     SHOULDER ARTHROSCOPY W/ ROTATOR CUFF REPAIR Bilateral     SPINAL CORD STIMULATOR IMPLANT N/A     TOTAL KNEE ARTHROPLASTY             Family History:     Family History   Problem Relation Age of Onset    Pulmonary Embolism Father     Cerebrovascular Disease Father     Hypertension Father     Heart Disease Father     Pulmonary Embolism Son     Other - See Comments Mother         blood dyscrasia    Cerebrovascular Disease Mother     Leukemia Brother     Colitis Brother     Glaucoma Brother            Allergies:     Allergies   Allergen Reactions    No Clinical Screening - See Comments Rash     All types of metal- wristwatches and eyeglasses.  Pt reports NO PROBLEMS with previous knee and hip replacements.    Amoxicillin-Pot Clavulanate Nausea    Chromium Other (See Comments)    Cortisone Unknown     Heart palpitations, flu-like symptoms - only happens with injection    Flurbiprofen Nausea    Gabapentin Other (See Comments)     dizziness    Iodine Other (See Comments)    Adhesive Tape Other (See Comments) and Rash    Cetirizine Nausea and Vomiting    Codeine Rash     Tolerates Dilaudid    Methocarbamol Other (See Comments)     Severe headache    Other Drug Allergy (See Comments) Other (See Comments)     Does not remember what reaction was    Salsalate Other (See Comments)     High doses causes ear ringing          Medications:   acetaminophen (TYLENOL) 500 MG tablet, Take 500 mg by mouth  albuterol (PROAIR HFA;PROVENTIL HFA;VENTOLIN HFA) 90 mcg/actuation inhaler, [ALBUTEROL (PROAIR HFA;PROVENTIL  HFA;VENTOLIN HFA) 90 MCG/ACTUATION INHALER] Inhale 2 puffs every 6 (six) hours as needed for wheezing.  calcium citrate (CALCITRATE) 200 mg (950 mg) tablet, [CALCIUM CITRATE (CALCITRATE) 200 MG (950 MG) TABLET] Take 1 tablet (200 mg total) by mouth 2 (two) times a day.  calcium-vitamin D 250-100 mg-unit per tablet, [CALCIUM-VITAMIN D 250-100 MG-UNIT PER TABLET] Take 1 tablet by mouth 2 (two) times a day.  collagenase ointment, [COLLAGENASE OINTMENT] Apply topically.  DULoxetine (CYMBALTA) 20 MG capsule, [DULOXETINE (CYMBALTA) 20 MG CAPSULE] Take 20 mg by mouth daily.  ergocalciferol (VITAMIN D2) 50,000 unit capsule, [ERGOCALCIFEROL (VITAMIN D2) 50,000 UNIT CAPSULE] Take 1 capsule (50,000 Units total) by mouth once a week.  fexofenadine (ALLEGRA) 180 MG tablet, [FEXOFENADINE (ALLEGRA) 180 MG TABLET] Take 180 mg by mouth daily as needed.  hydrOXYzine (VISTARIL) 50 MG capsule, [HYDROXYZINE (VISTARIL) 50 MG CAPSULE] Take by mouth.  lidocaine (LIDODERM) 5 %, [LIDOCAINE (LIDODERM) 5 %] Place 1-2 patches on the skin daily as needed. Remove & Discard patch within 12 hours or as directed by MD   metoprolol tartrate (LOPRESSOR) 25 MG tablet, [METOPROLOL TARTRATE (LOPRESSOR) 25 MG TABLET] Take 25 mg by mouth 2 (two) times a day.  metoprolol tartrate (LOPRESSOR) 25 MG tablet, [METOPROLOL TARTRATE (LOPRESSOR) 25 MG TABLET] Take 25 mg by mouth daily as needed (for rapid heart rate).  oxyCODONE (ROXICODONE) 5 MG immediate release tablet, [OXYCODONE (ROXICODONE) 5 MG IMMEDIATE RELEASE TABLET] Take by mouth.  ranitidine (ZANTAC) 150 MG tablet, [RANITIDINE (ZANTAC) 150 MG TABLET] Take 150 mg by mouth daily as needed for heartburn.  triamcinolone (KENALOG) 0.1 % cream, [TRIAMCINOLONE (KENALOG) 0.1 % CREAM] Apply topically 2 (two) times a day as needed.  UNABLE TO FIND, [UNABLE TO FIND] Administer 1 drop to both eyes as needed (itchy eyes). Med Name:  Similasan Allergy Eye Drop, this is the brand that works for patient    No current  "facility-administered medications on file prior to visit.         Exam:   Pulse 70   Resp 17   Ht 1.727 m (5' 8\")   Wt 139.3 kg (307 lb)   SpO2 95%   BMI 46.68 kg/m      Gen: sitting upright, in no distress  HEENT: atraumatic, EOMI  Oropharynx:  CV: RRR, no peripheral edema noted  Resp: no increased WOB, breath sounds present bilaterally throughout all lung fields, no wheezing  Abd: not distended, non-tender  Skin: no rashes or lesions on exposed skin  Extremities: moving all extremities, no gross deformities  Neuro: alert and oriented no FND         Data:   Labs: bicarb 35 on CMP 5/3/23.    CXR 10/3/23 IMPRESSION: Elevated right hemidiaphragm with mild right basilar atelectasis.    CT chest 8/13/21 IMPRESSION:   1. No acute findings in the chest.   2. Incidental finding of 1.7 cm indeterminate lesion off the right upper kidney.  May represent a complex or proteinaceous cyst however a solid lesion is not excluded.  Further evaluation with renal ultrasound is recommended.   3. Sub 6 mm pulmonary nodules.  Follow-up can be obtained per Fleischner society guidelines provided below.   4. Post cholecystectomy.   5. Ruptured left breast implant.     PFTs 10/4/23:      ECHO 4/26/2022 Summary:    * The estimated ejection fraction is 50-55%.     * Normal right ventricular systolic function.     * There is no aortic stenosis with a peak velocity of  142 cm/s.     * The IVC is normal in size (< 2.1 cm), < 50% respiratory variance, RA pressure elevated at 8 mmHg.     Sniff 3/27/2021 in care everywhere- R diaphragm paralysis         Assessment and Recommendations:   Chronic hypoxemic respiratory insufficiency, 2L during day and night  Restrictive lung disease  R paralyzed diaphragm  Breast cancer s/p mastectomy, breast implants, now removed  Obesity, BMI 47  Multiple spinal surgeries with hardware, spinal stimulator  Hx tobacco use, quit 1986    Ms. Marroqiun is a 72-year-old woman who presents for evaluation of dyspnea, " chronic oxygen needs.  Her symptoms are almost certainly multifactorial.  She has moderate restriction on pulmonary function test.  She has known right paralyzed diaphragm since 2012, and has worsening dyspnea when laying flat and bending over which is consistent with this.  We will pursue seated and supine PFTs for evaluation.  At this point, do not recommend surgical evaluation for plication, will address other factors first.  She has had multiple spinal surgeries and hardware, likely contributing.  She also has a history of breast cancer and had double mastectomy with implants, has had leakage of implants and feels a band around her chest which may also be contributing to decreased chest wall motion.  We will consider focused physical therapy to address this.    Her generalized fatigue is likely multifactorial as well, high concern that sleep disordered breathing may be contributing and despite a sleep study in the past she only had 51 minutes of sleep during that study so was not adequate.  She should repeat this.  Pending results of the sleep study, we could consider a morning ABG to qualify for overnight ventilatory support in the setting of restrictive lung disease.    --Seated and supine PFTs  -- 6-minute walk test to assess oxygen needs  -- Referral to pulmonary rehab  --Consider referral to chest wall focused physical therapy  --Encouraged ongoing weight loss and increased exercise  --Repeat sleep study, if no evidence of sleep apnea/need for BiPAP, would consider a.m. ABG and possible ventilatory support for restrictive lung disease    Patient staffed with Dr. Cody. Return to clinic in 3 months    Rosana Savage DO  Pulmonary and Critical Care Fellow

## 2023-10-07 LAB
DLCOUNC-%PRED-PRE: 92 %
DLCOUNC-PRE: 19.06 ML/MIN/MMHG
DLCOUNC-PRED: 20.71 ML/MIN/MMHG
ERV-%PRED-PRE: 6 %
ERV-PRE: 0.07 L
ERV-PRED: 1.12 L
EXPTIME-PRE: 6.15 SEC
FEF2575-%PRED-POST: 92 %
FEF2575-%PRED-PRE: 67 %
FEF2575-POST: 1.69 L/SEC
FEF2575-PRE: 1.23 L/SEC
FEF2575-PRED: 1.83 L/SEC
FEFMAX-%PRED-PRE: 69 %
FEFMAX-PRE: 4.18 L/SEC
FEFMAX-PRED: 6.05 L/SEC
FEV1-%PRED-PRE: 64 %
FEV1-PRE: 1.44 L
FEV1FEV6-PRE: 78 %
FEV1FEV6-PRED: 79 %
FEV1FVC-PRE: 78 %
FEV1FVC-PRED: 78 %
FEV1SVC-PRE: 78 %
FEV1SVC-PRED: 65 %
FIFMAX-PRE: 3.18 L/SEC
FRCPLETH-%PRED-PRE: 74 %
FRCPLETH-PRE: 2.2 L
FRCPLETH-PRED: 2.94 L
FVC-%PRED-PRE: 63 %
FVC-PRE: 1.85 L
FVC-PRED: 2.91 L
IC-%PRED-PRE: 79 %
IC-PRE: 1.78 L
IC-PRED: 2.24 L
RVPLETH-%PRED-PRE: 92 %
RVPLETH-PRE: 2.12 L
RVPLETH-PRED: 2.29 L
TLCPLETH-%PRED-PRE: 70 %
TLCPLETH-PRE: 3.97 L
TLCPLETH-PRED: 5.61 L
VA-%PRED-PRE: 66 %
VA-PRE: 3.42 L
VC-%PRED-PRE: 53 %
VC-PRE: 1.85 L
VC-PRED: 3.49 L

## 2023-10-09 ENCOUNTER — TELEPHONE (OUTPATIENT)
Dept: PULMONOLOGY | Facility: CLINIC | Age: 73
End: 2023-10-09
Payer: OTHER GOVERNMENT

## 2023-10-09 NOTE — TELEPHONE ENCOUNTER
Patient Contacted for the patient to call back and schedule the following:    Appointment type: FPFT  Provider: CAROLA  Return date: 12/20/23  Specialty phone number: [phone]  Additional appointment(s) needed: N/A   Additonal Notes: N/A

## 2023-10-25 ENCOUNTER — HOSPITAL ENCOUNTER (OUTPATIENT)
Dept: CARDIAC REHAB | Facility: CLINIC | Age: 73
Discharge: HOME | End: 2023-10-25
Attending: STUDENT IN AN ORGANIZED HEALTH CARE EDUCATION/TRAINING PROGRAM
Payer: MEDICARE

## 2023-10-25 DIAGNOSIS — J98.4 RESTRICTIVE LUNG DISEASE: ICD-10-CM

## 2023-10-25 DIAGNOSIS — J98.6 DISORDER OF DIAPHRAGM: ICD-10-CM

## 2023-10-25 PROCEDURE — G0238 OTH RESP PROC, INDIV: HCPCS

## 2023-10-31 ENCOUNTER — HOSPITAL ENCOUNTER (OUTPATIENT)
Dept: CARDIAC REHAB | Facility: CLINIC | Age: 73
Discharge: HOME | End: 2023-10-31
Attending: STUDENT IN AN ORGANIZED HEALTH CARE EDUCATION/TRAINING PROGRAM
Payer: MEDICARE

## 2023-10-31 PROCEDURE — G0239 OTH RESP PROC, GROUP: HCPCS

## 2023-11-02 ENCOUNTER — HOSPITAL ENCOUNTER (OUTPATIENT)
Dept: CARDIAC REHAB | Facility: CLINIC | Age: 73
Discharge: HOME | End: 2023-11-02
Attending: STUDENT IN AN ORGANIZED HEALTH CARE EDUCATION/TRAINING PROGRAM
Payer: MEDICARE

## 2023-11-02 PROCEDURE — G0238 OTH RESP PROC, INDIV: HCPCS

## 2023-11-16 ENCOUNTER — HOSPITAL ENCOUNTER (OUTPATIENT)
Dept: CARDIAC REHAB | Facility: CLINIC | Age: 73
Discharge: HOME | End: 2023-11-16
Attending: STUDENT IN AN ORGANIZED HEALTH CARE EDUCATION/TRAINING PROGRAM
Payer: MEDICARE

## 2023-11-16 PROCEDURE — G0239 OTH RESP PROC, GROUP: HCPCS

## 2023-12-20 ENCOUNTER — OFFICE VISIT (OUTPATIENT)
Dept: PULMONOLOGY | Facility: CLINIC | Age: 73
End: 2023-12-20
Attending: STUDENT IN AN ORGANIZED HEALTH CARE EDUCATION/TRAINING PROGRAM
Payer: MEDICARE

## 2023-12-20 ENCOUNTER — OFFICE VISIT (OUTPATIENT)
Dept: PULMONOLOGY | Facility: CLINIC | Age: 73
End: 2023-12-20
Attending: STUDENT IN AN ORGANIZED HEALTH CARE EDUCATION/TRAINING PROGRAM
Payer: OTHER GOVERNMENT

## 2023-12-20 VITALS — HEART RATE: 77 BPM | DIASTOLIC BLOOD PRESSURE: 90 MMHG | OXYGEN SATURATION: 97 % | SYSTOLIC BLOOD PRESSURE: 162 MMHG

## 2023-12-20 DIAGNOSIS — J98.6 DISORDER OF DIAPHRAGM: ICD-10-CM

## 2023-12-20 DIAGNOSIS — R94.2 ABNORMAL PFT: Primary | ICD-10-CM

## 2023-12-20 DIAGNOSIS — J98.4 RESTRICTIVE LUNG DISEASE: ICD-10-CM

## 2023-12-20 LAB
6 MIN WALK (FT): 850 FT
6 MIN WALK (M): 259 M

## 2023-12-20 PROCEDURE — 94618 PULMONARY STRESS TESTING: CPT | Performed by: INTERNAL MEDICINE

## 2023-12-20 PROCEDURE — 94375 RESPIRATORY FLOW VOLUME LOOP: CPT | Performed by: INTERNAL MEDICINE

## 2023-12-20 PROCEDURE — 99214 OFFICE O/P EST MOD 30 MIN: CPT | Mod: 25 | Performed by: STUDENT IN AN ORGANIZED HEALTH CARE EDUCATION/TRAINING PROGRAM

## 2023-12-20 PROCEDURE — 99213 OFFICE O/P EST LOW 20 MIN: CPT | Performed by: STUDENT IN AN ORGANIZED HEALTH CARE EDUCATION/TRAINING PROGRAM

## 2023-12-20 PROCEDURE — G0463 HOSPITAL OUTPT CLINIC VISIT: HCPCS | Performed by: STUDENT IN AN ORGANIZED HEALTH CARE EDUCATION/TRAINING PROGRAM

## 2023-12-20 ASSESSMENT — PAIN SCALES - GENERAL: PAINLEVEL: NO PAIN (0)

## 2023-12-20 NOTE — LETTER
12/20/2023         RE: Erika Marroquin  [address]  [facility] [place] [address]        Dear Colleague,    Thank you for referring your patient, Erika Marroquin, to the [facility] [place]. Please see a copy of my visit note below.    Pulmonology Clinic Follow up Visit       Erika Marroquin MRN# [id number]   YOB: 1950 Age: 73 year old     Reason for Visit: follow up hypoxia, restrictive lung disease        Assessment and Plan:   Chronic hypoxemic respiratory insufficiency, 2L during day and night  Restrictive lung disease  R paralyzed diaphragm  Breast cancer s/p mastectomy, breast implants, now removed  Obesity, BMI 47  Multiple spinal surgeries with hardware, spinal stimulator  Hx tobacco use, quit 1986    Ms. Marroquin presents in follow up. She has a complicated pulmonary picture, and restrictive PFTs, most likely related to spinal surgeries/pain, obesity (and associated hypoventilation), oxycodone use (hypoventilation), paralyzed diaphragm, history of mastectomy/multiple surgeries for reconstruction. Seated to supine spirometry reveals 15% decrease.   Overall, suspect she will most benefit from increased activity, though challenging as spinal pain limits non-water based therapies. Would recommend repeating walk test without oxygen support to consider return to this.     RTC in 5 months    Patient seen and discussed with Dr. Ericka Savage, DO  Pulmonary and Critical Care Fellow    I saw and evaluated patient with Fellow.  Case discussed - agree with note. Encouraged physical activity as she is able - she is limited by back pain.  Will do 6MWT to determine if she needs oxygen with activity as she would like to resume pool exercise.    AJITH CAMACHO M.D.         History of Present Illness / Interval History:   Since our last visit, overall feeling a bit better.  She has been shopping for [employer] and feels like she has been walking around stores  a bit more than normal.  She has not been using electric carts as sometimes this they do not have them.  She has also cut down on the amount of oxycodone she is taking every day which she feels like may be helping as well.  She does use albuterol inhaler every once in a while, mostly when she has a bronchitis or sputum which irritates her.  She did go to pulmonary rehab for a few visits however the treadmill and bike exercises flared her back pain and she was unable to participate anymore.  She continues to have palpitations, is waiting to see her cardiologist in January.  When she has the fast heart rate she can feel it, she also notices her pulse oximeter does not read as well, she will even get nausea and fatigue sometimes.  She continues to take her metoprolol tartrate 25 mg twice daily.        Review of Systems:   ROS negative except as noted above.          Physical Examination:   BP (!) 162/90 (BP Location: Right arm, Patient Position: Chair, Cuff Size: Adult Large)   Pulse 77   SpO2 97%     Physical Exam  Gen: sitting upright, in no distress  HEENT: atraumatic, EOMI  CV: RRR, no peripheral edema noted  Resp: no increased WOB, CTAB  Skin: no rashes or lesions on exposed skin  Extremities: moving all extremities, no gross deformities  Neuro: alert and oriented no FND        Data:   PFTs 12/20/23    CXR 10/3/23 IMPRESSION: Elevated right hemidiaphragm with mild right basilar atelectasis.     CT chest 8/13/21 IMPRESSION:   1. No acute findings in the chest.   2. Incidental finding of 1.7 cm indeterminate lesion off the right upper kidney.  May represent a complex or proteinaceous cyst however a solid lesion is not excluded.  Further evaluation with renal ultrasound is recommended.   3. Sub 6 mm pulmonary nodules.  Follow-up can be obtained per Fleischner society guidelines provided below.   4. Post cholecystectomy.   5. Ruptured left breast implant.    ECHO 4/26/2022 Summary:    * The estimated ejection  fraction is 50-55%.     * Normal right ventricular systolic function.     * There is no aortic stenosis with a peak velocity of  142 cm/s.     * The IVC is normal in size (< 2.1 cm), < 50% respiratory variance, RA pressure elevated at 8 mmHg.      Sniff test 3/27/2021 in care everywhere- R diaphragm paralysis    Sleep study care everywhere 12/12/23. See report.        Medications:     Current Outpatient Medications   Medication     acetaminophen (TYLENOL) 500 MG tablet     albuterol (PROAIR HFA;PROVENTIL HFA;VENTOLIN HFA) 90 mcg/actuation inhaler     calcium-vitamin D 250-100 mg-unit per tablet     celecoxib (CELEBREX) 200 MG capsule     cetirizine (ZYRTEC) 10 MG tablet     clobetasol (TEMOVATE) 0.05 % external cream     DULoxetine (CYMBALTA) 20 MG capsule     famotidine (PEPCID) 20 MG tablet     fluticasone (FLONASE) 50 MCG/ACT nasal spray     furosemide (LASIX) 20 MG tablet     lidocaine (LIDODERM) 5 %     metoprolol tartrate (LOPRESSOR) 25 MG tablet     omeprazole (PRILOSEC) 40 MG DR capsule     oxyCODONE (ROXICODONE) 5 MG immediate release tablet     triamcinolone (KENALOG) 0.1 % cream     calcium citrate (CALCITRATE) 200 mg (950 mg) tablet     collagenase ointment     ergocalciferol (VITAMIN D2) 50,000 unit capsule     fexofenadine (ALLEGRA) 180 MG tablet     hydrOXYzine (VISTARIL) 50 MG capsule     metoprolol tartrate (LOPRESSOR) 25 MG tablet     ranitidine (ZANTAC) 150 MG tablet     UNABLE TO FIND     No current facility-administered medications for this visit.         Again, thank you for allowing me to participate in the care of your patient.        Sincerely,        Rosana Savage MD

## 2023-12-20 NOTE — PATIENT INSTRUCTIONS
It was good to see you today.    Follow up with your cardiologist, and then let's repeat the walk test without oxygen.   Pending results, we can try to get you back to pool therapy.

## 2023-12-20 NOTE — PROGRESS NOTES
Pulmonology Clinic Follow up Visit       Erika Marroquin MRN# [id number]   YOB: 1950 Age: 73 year old     Reason for Visit: follow up hypoxia, restrictive lung disease        Assessment and Plan:   Chronic hypoxemic respiratory insufficiency, 2L during day and night  Restrictive lung disease  R paralyzed diaphragm  Breast cancer s/p mastectomy, breast implants, now removed  Obesity, BMI 47  Multiple spinal surgeries with hardware, spinal stimulator  Hx tobacco use, quit 1986    Ms. Marroquin presents in follow up. She has a complicated pulmonary picture, and restrictive PFTs, most likely related to spinal surgeries/pain, obesity (and associated hypoventilation), oxycodone use (hypoventilation), paralyzed diaphragm, history of mastectomy/multiple surgeries for reconstruction. Seated to supine spirometry reveals 15% decrease.   Overall, suspect she will most benefit from increased activity, though challenging as spinal pain limits non-water based therapies. Would recommend repeating walk test without oxygen support to consider return to this.     RTC in 5 months    Patient seen and discussed with Dr. Ericka Savage, DO  Pulmonary and Critical Care Fellow    I saw and evaluated patient with Fellow.  Case discussed - agree with note. Encouraged physical activity as she is able - she is limited by back pain.  Will do 6MWT to determine if she needs oxygen with activity as she would like to resume pool exercise.    AJITH CAMACHO M.D.         History of Present Illness / Interval History:   Since our last visit, overall feeling a bit better.  She has been shopping for [employer] and feels like she has been walking around stores a bit more than normal.  She has not been using electric carts as sometimes this they do not have them.  She has also cut down on the amount of oxycodone she is taking every day which she feels like may be helping as well.  She does use albuterol inhaler every once in a while, mostly  when she has a bronchitis or sputum which irritates her.  She did go to pulmonary rehab for a few visits however the treadmill and bike exercises flared her back pain and she was unable to participate anymore.  She continues to have palpitations, is waiting to see her cardiologist in January.  When she has the fast heart rate she can feel it, she also notices her pulse oximeter does not read as well, she will even get nausea and fatigue sometimes.  She continues to take her metoprolol tartrate 25 mg twice daily.        Review of Systems:   ROS negative except as noted above.          Physical Examination:   BP (!) 162/90 (BP Location: Right arm, Patient Position: Chair, Cuff Size: Adult Large)   Pulse 77   SpO2 97%     Physical Exam  Gen: sitting upright, in no distress  HEENT: atraumatic, EOMI  CV: RRR, no peripheral edema noted  Resp: no increased WOB, CTAB  Skin: no rashes or lesions on exposed skin  Extremities: moving all extremities, no gross deformities  Neuro: alert and oriented no FND        Data:   PFTs 12/20/23    CXR 10/3/23 IMPRESSION: Elevated right hemidiaphragm with mild right basilar atelectasis.     CT chest 8/13/21 IMPRESSION:   1. No acute findings in the chest.   2. Incidental finding of 1.7 cm indeterminate lesion off the right upper kidney.  May represent a complex or proteinaceous cyst however a solid lesion is not excluded.  Further evaluation with renal ultrasound is recommended.   3. Sub 6 mm pulmonary nodules.  Follow-up can be obtained per Fleischner society guidelines provided below.   4. Post cholecystectomy.   5. Ruptured left breast implant.    ECHO 4/26/2022 Summary:    * The estimated ejection fraction is 50-55%.     * Normal right ventricular systolic function.     * There is no aortic stenosis with a peak velocity of  142 cm/s.     * The IVC is normal in size (< 2.1 cm), < 50% respiratory variance, RA pressure elevated at 8 mmHg.      Sniff test 3/27/2021 in care everywhere- R  diaphragm paralysis    Sleep study care everywhere 12/12/23. See report.        Medications:     Current Outpatient Medications   Medication    acetaminophen (TYLENOL) 500 MG tablet    albuterol (PROAIR HFA;PROVENTIL HFA;VENTOLIN HFA) 90 mcg/actuation inhaler    calcium-vitamin D 250-100 mg-unit per tablet    celecoxib (CELEBREX) 200 MG capsule    cetirizine (ZYRTEC) 10 MG tablet    clobetasol (TEMOVATE) 0.05 % external cream    DULoxetine (CYMBALTA) 20 MG capsule    famotidine (PEPCID) 20 MG tablet    fluticasone (FLONASE) 50 MCG/ACT nasal spray    furosemide (LASIX) 20 MG tablet    lidocaine (LIDODERM) 5 %    metoprolol tartrate (LOPRESSOR) 25 MG tablet    omeprazole (PRILOSEC) 40 MG DR capsule    oxyCODONE (ROXICODONE) 5 MG immediate release tablet    triamcinolone (KENALOG) 0.1 % cream    calcium citrate (CALCITRATE) 200 mg (950 mg) tablet    collagenase ointment    ergocalciferol (VITAMIN D2) 50,000 unit capsule    fexofenadine (ALLEGRA) 180 MG tablet    hydrOXYzine (VISTARIL) 50 MG capsule    metoprolol tartrate (LOPRESSOR) 25 MG tablet    ranitidine (ZANTAC) 150 MG tablet    UNABLE TO FIND     No current facility-administered medications for this visit.

## 2023-12-20 NOTE — NURSING NOTE
Chief Complaint   Patient presents with    Follow Up     3 month follow up      Vitals were taken and medications were reconciled.     Renee Perez RMA  4:00 PM

## 2023-12-21 LAB
EXPTIME-PRE: 5.69 SEC
FEF2575-%PRED-POST: 25 %
FEF2575-%PRED-PRE: 89 %
FEF2575-POST: 0.46 L/SEC
FEF2575-PRE: 1.62 L/SEC
FEF2575-PRED: 1.82 L/SEC
FEFMAX-%PRED-PRE: 80 %
FEFMAX-PRE: 4.85 L/SEC
FEFMAX-PRED: 6.03 L/SEC
FEV1-%PRED-PRE: 73 %
FEV1-PRE: 1.64 L
FEV1FEV6-PRE: 80 %
FEV1FEV6-PRED: 79 %
FEV1FVC-PRE: 80 %
FEV1FVC-PRED: 78 %
FIFMAX-PRE: 4.09 L/SEC
FVC-%PRED-PRE: 70 %
FVC-PRE: 2.04 L
FVC-PRED: 2.9 L

## 2023-12-27 ENCOUNTER — TELEPHONE (OUTPATIENT)
Dept: PULMONOLOGY | Facility: CLINIC | Age: 73
End: 2023-12-27
Payer: OTHER GOVERNMENT

## 2023-12-27 NOTE — TELEPHONE ENCOUNTER
Patient Contacted for the patient to call back and schedule the following:    Appointment type: ALONZO  Provider: CAROLA  Return date: 05/24/2024  Specialty phone number: [phone]  Additional appointment(s) needed: N/A  Additonal Notes: N/A

## 2024-03-09 ENCOUNTER — HEALTH MAINTENANCE LETTER (OUTPATIENT)
Age: 74
End: 2024-03-09

## 2024-07-10 ENCOUNTER — OFFICE VISIT (OUTPATIENT)
Dept: PULMONOLOGY | Facility: CLINIC | Age: 74
End: 2024-07-10
Attending: STUDENT IN AN ORGANIZED HEALTH CARE EDUCATION/TRAINING PROGRAM
Payer: MEDICARE

## 2024-07-10 VITALS — SYSTOLIC BLOOD PRESSURE: 163 MMHG | OXYGEN SATURATION: 94 % | DIASTOLIC BLOOD PRESSURE: 83 MMHG | HEART RATE: 62 BPM

## 2024-07-10 DIAGNOSIS — J98.4 RESTRICTIVE LUNG DISEASE: ICD-10-CM

## 2024-07-10 DIAGNOSIS — J45.909 REACTIVE AIRWAY DISEASE WITHOUT COMPLICATION, UNSPECIFIED ASTHMA SEVERITY, UNSPECIFIED WHETHER PERSISTENT: Primary | ICD-10-CM

## 2024-07-10 DIAGNOSIS — E66.01 CLASS 2 SEVERE OBESITY DUE TO EXCESS CALORIES WITH SERIOUS COMORBIDITY IN ADULT, UNSPECIFIED BMI (H): ICD-10-CM

## 2024-07-10 DIAGNOSIS — J98.6 DISORDER OF DIAPHRAGM: ICD-10-CM

## 2024-07-10 DIAGNOSIS — E66.812 CLASS 2 SEVERE OBESITY DUE TO EXCESS CALORIES WITH SERIOUS COMORBIDITY IN ADULT, UNSPECIFIED BMI (H): ICD-10-CM

## 2024-07-10 PROCEDURE — 99213 OFFICE O/P EST LOW 20 MIN: CPT | Mod: GC | Performed by: STUDENT IN AN ORGANIZED HEALTH CARE EDUCATION/TRAINING PROGRAM

## 2024-07-10 PROCEDURE — G0463 HOSPITAL OUTPT CLINIC VISIT: HCPCS | Performed by: STUDENT IN AN ORGANIZED HEALTH CARE EDUCATION/TRAINING PROGRAM

## 2024-07-10 RX ORDER — MONTELUKAST SODIUM 10 MG/1
10 TABLET ORAL AT BEDTIME
Qty: 90 TABLET | Refills: 3 | Status: SHIPPED | OUTPATIENT
Start: 2024-07-10

## 2024-07-10 ASSESSMENT — PAIN SCALES - GENERAL: PAINLEVEL: NO PAIN (0)

## 2024-07-10 NOTE — PROGRESS NOTES
Pulmonology Clinic Follow up Visit     Erika Marroquin MRN# [id number]   YOB: 1950 Age: 73 year old     Reason for Visit: restrictive lung disease follow up         Assessment and Plan:   Chronic hypoxemic respiratory insufficiency, 2L during day and night  2. Restrictive lung disease  3. R paralyzed diaphragm  4. Breast cancer s/p mastectomy, breast implants, now removed  5. Obesity, BMI 47  6. Multiple spinal surgeries with hardware, spinal stimulator  7. Hx tobacco use, quit 1986     72 yo w/ complicated pulmonary hx including restrictive PFTs, most likely related to spinal surgeries/pain, obesity (and associated hypoventilation), oxycodone use (hypoventilation), paralyzed diaphragm, history of mastectomy/multiple surgeries for reconstruction. Seated to supine spirometry reveals 15% decrease.     She is reporting improving respiratory symptoms and today is here without oxygen.  She does endorse significant allergies and some exercise-induced wheezing and shortness of breath.  Suspect some underlying RAD, and will trial Singulair for the allergy and recommended using the albuterol as needed prior to heavy activity.  Encouraged ongoing increased activity.     - Continue albuterol PRN, will consider SMART therapy in future   - Patient to undergo 6MWT off of oxygen (she prefers to independently request this at the pulmonary rehab closer to her home, she knows to reach out if there are issues organizing this)   - Start Singulair 10 mg at bedtime daily  - Encouraged increased physical exercise   - Regarding pulmonary risk of moderate conscious sedation with colonoscopy. Patient would be high risk for this procedure as evidenced by the > 300 ml and 15% decrease in lung function with the seated to supine spirometry. If this procedure were to be done, we would also recommend consideration of alternate positioning if feasible to minimize risk with supine position.     RTC: 6 months    Patient seen and  discussed with Dr. Johnny Zheng MD  Pulmonary and Critical Care Fellow        History of Present Illness / Interval History:   Erika Marroquin is a 73 year old female with H/O chronic resp failure, restrictive lung disease, multiple spinal surgeries who presents to clinic for follow up.    Today, she reports that she feels like her breathing continues to improve.  She has been doing lots of gardening around her house.  She has not yet started swimming classes but will consider it in the fall.  She has not undergone gone on a walk test without oxygen however today she is not wearing oxygen.Reports some wheezing with heavy exertion and specifically while gardening.     She reports significant allergies especially with being outside more.  She occasionally takes over-the-counter medication but has had some side effects with use.  Allergies are mainly due to pollen.    She has an upcoming colonoscopy and is wondering if it would be safe to undergo moderate conscious sedation.        Review of Systems:   Focused ROS performed, negative except as noted above.         Physical Examination:   BP (!) 163/83 (BP Location: Right arm, Patient Position: Sitting, Cuff Size: Adult Large)   Pulse 62   SpO2 94%     Gen: sitting upright,without oxygen, ambulating with walker  HEENT: atraumatic, EOMI  Oropharynx: no sinus tenderness   CV: RRR  Resp: no increased WOB, no crackles or wheezing   Abd: not distended, non-tender  Skin: no rashes or lesions on exposed skin  Extremities: non-pitting edema  Neuro: alert and oriented no FND         Data:   PFTs 12/20/23    CXR 10/3/23 IMPRESSION: Elevated right hemidiaphragm with mild right basilar atelectasis.     CT chest 8/13/21 IMPRESSION:   1. No acute findings in the chest.   2. Incidental finding of 1.7 cm indeterminate lesion off the right upper kidney.  May represent a complex or proteinaceous cyst however a solid lesion is not excluded.  Further evaluation with renal  ultrasound is recommended.   3. Sub 6 mm pulmonary nodules.  Follow-up can be obtained per Fleischner society guidelines provided below.   4. Post cholecystectomy.   5. Ruptured left breast implant.     ECHO 4/26/2022 Summary:    * The estimated ejection fraction is 50-55%.     * Normal right ventricular systolic function.     * There is no aortic stenosis with a peak velocity of  142 cm/s.     * The IVC is normal in size (< 2.1 cm), < 50% respiratory variance, RA pressure elevated at 8 mmHg.      Sniff test 3/27/2021 in care everywhere- R diaphragm paralysis     Sleep study care everywhere 12/12/23. See report.        Medications:     Current Outpatient Medications   Medication Sig Dispense Refill    acetaminophen (TYLENOL) 500 MG tablet Take 500 mg by mouth      albuterol (PROAIR HFA;PROVENTIL HFA;VENTOLIN HFA) 90 mcg/actuation inhaler [ALBUTEROL (PROAIR HFA;PROVENTIL HFA;VENTOLIN HFA) 90 MCG/ACTUATION INHALER] Inhale 2 puffs every 6 (six) hours as needed for wheezing.      calcium-vitamin D 250-100 mg-unit per tablet [CALCIUM-VITAMIN D 250-100 MG-UNIT PER TABLET] Take 1 tablet by mouth 2 (two) times a day.      celecoxib (CELEBREX) 200 MG capsule Take 200 mg by mouth daily      cetirizine (ZYRTEC) 10 MG tablet Take 10 mg by mouth daily      clobetasol (TEMOVATE) 0.05 % external cream Apply topically 2 times daily      DULoxetine (CYMBALTA) 20 MG capsule [DULOXETINE (CYMBALTA) 20 MG CAPSULE] Take 20 mg by mouth daily.      famotidine (PEPCID) 20 MG tablet Take 20 mg by mouth 2 times daily      fluticasone (FLONASE) 50 MCG/ACT nasal spray Spray 1 spray into both nostrils daily      furosemide (LASIX) 20 MG tablet Take 20 mg by mouth daily      lidocaine (LIDODERM) 5 % [LIDOCAINE (LIDODERM) 5 %] Place 1-2 patches on the skin daily as needed. Remove & Discard patch within 12 hours or as directed by MD       metoprolol tartrate (LOPRESSOR) 25 MG tablet [METOPROLOL TARTRATE (LOPRESSOR) 25 MG TABLET] Take 25 mg by mouth  2 (two) times a day.      montelukast (SINGULAIR) 10 MG tablet Take 1 tablet (10 mg) by mouth at bedtime 90 tablet 3    omeprazole (PRILOSEC) 40 MG DR capsule Take 40 mg by mouth daily      oxyCODONE (ROXICODONE) 5 MG immediate release tablet [OXYCODONE (ROXICODONE) 5 MG IMMEDIATE RELEASE TABLET] Take by mouth.      triamcinolone (KENALOG) 0.1 % cream [TRIAMCINOLONE (KENALOG) 0.1 % CREAM] Apply topically 2 (two) times a day as needed.      calcium citrate (CALCITRATE) 200 mg (950 mg) tablet [CALCIUM CITRATE (CALCITRATE) 200 MG (950 MG) TABLET] Take 1 tablet (200 mg total) by mouth 2 (two) times a day. (Patient not taking: Reported on 10/4/2023) 200 tablet 4    collagenase ointment [COLLAGENASE OINTMENT] Apply topically. (Patient not taking: Reported on 10/4/2023)      ergocalciferol (VITAMIN D2) 50,000 unit capsule [ERGOCALCIFEROL (VITAMIN D2) 50,000 UNIT CAPSULE] Take 1 capsule (50,000 Units total) by mouth once a week. (Patient not taking: Reported on 10/4/2023) 12 capsule 4    fexofenadine (ALLEGRA) 180 MG tablet [FEXOFENADINE (ALLEGRA) 180 MG TABLET] Take 180 mg by mouth daily as needed. (Patient not taking: Reported on 10/4/2023)      hydrOXYzine (VISTARIL) 50 MG capsule [HYDROXYZINE (VISTARIL) 50 MG CAPSULE] Take by mouth. (Patient not taking: Reported on 10/4/2023)      metoprolol tartrate (LOPRESSOR) 25 MG tablet [METOPROLOL TARTRATE (LOPRESSOR) 25 MG TABLET] Take 25 mg by mouth daily as needed (for rapid heart rate). (Patient not taking: Reported on 10/4/2023)      ranitidine (ZANTAC) 150 MG tablet [RANITIDINE (ZANTAC) 150 MG TABLET] Take 150 mg by mouth daily as needed for heartburn. (Patient not taking: Reported on 10/4/2023)      UNABLE TO FIND [UNABLE TO FIND] Administer 1 drop to both eyes as needed (itchy eyes). Med Name:  Similasan Allergy Eye Drop, this is the brand that works for patient (Patient not taking: Reported on 10/4/2023)       No current facility-administered medications for this visit.

## 2024-07-10 NOTE — PATIENT INSTRUCTIONS
Thanks for coming in, as we discussed:  - Start Singulair 10 mg at night daily  - Albuterol as needed  - Recommend a six minute walk test without oxygen to assess needs   - We do believe that you are high risk for moderate conscious sedation

## 2024-07-10 NOTE — NURSING NOTE
Chief Complaint   Patient presents with    Follow Up     6 month follow up      Vitals were taken and medications were reconciled.    Renee Perez RMA  4:09 PM

## 2024-07-10 NOTE — LETTER
7/10/2024      Erika Marroquin  [address]  [facility] [address]      Dear Colleague,    Thank you for referring your patient, Erika Marroquin, to the [facility] [place]. Please see a copy of my visit note below.    Pulmonology Clinic Follow up Visit     Erika Marroquin MRN# [id number]   YOB: 1950 Age: 73 year old     Reason for Visit: restrictive lung disease follow up         Assessment and Plan:   Chronic hypoxemic respiratory insufficiency, 2L during day and night  2. Restrictive lung disease  3. R paralyzed diaphragm  4. Breast cancer s/p mastectomy, breast implants, now removed  5. Obesity, BMI 47  6. Multiple spinal surgeries with hardware, spinal stimulator  7. Hx tobacco use, quit 1986     72 yo w/ complicated pulmonary hx including restrictive PFTs, most likely related to spinal surgeries/pain, obesity (and associated hypoventilation), oxycodone use (hypoventilation), paralyzed diaphragm, history of mastectomy/multiple surgeries for reconstruction. Seated to supine spirometry reveals 15% decrease.     She is reporting improving respiratory symptoms and today is here without oxygen.  She does endorse significant allergies and some exercise-induced wheezing and shortness of breath.  Suspect some underlying RAD, and will trial Singulair for the allergy and recommended using the albuterol as needed prior to heavy activity.  Encouraged ongoing increased activity.     - Continue albuterol PRN, will consider SMART therapy in future   - Patient to undergo 6MWT off of oxygen (she prefers to independently request this at the pulmonary rehab closer to her home, she knows to reach out if there are issues organizing this)   - Start Singulair 10 mg at bedtime daily  - Encouraged increased physical exercise   - Regarding pulmonary risk of moderate conscious sedation with colonoscopy. Patient would be high risk for this procedure as evidenced by the >  300 ml and 15% decrease in lung function with the seated to supine spirometry. If this procedure were to be done, we would also recommend consideration of alternate positioning if feasible to minimize risk with supine position.     RTC: 6 months    Patient seen and discussed with Dr. Johnny Zheng MD  Pulmonary and Critical Care Fellow        History of Present Illness / Interval History:   Erika Marroquin is a 73 year old female with H/O chronic resp failure, restrictive lung disease, multiple spinal surgeries who presents to clinic for follow up.    Today, she reports that she feels like her breathing continues to improve.  She has been doing lots of gardening around her house.  She has not yet started swimming classes but will consider it in the fall.  She has not undergone gone on a walk test without oxygen however today she is not wearing oxygen.Reports some wheezing with heavy exertion and specifically while gardening.     She reports significant allergies especially with being outside more.  She occasionally takes over-the-counter medication but has had some side effects with use.  Allergies are mainly due to pollen.    She has an upcoming colonoscopy and is wondering if it would be safe to undergo moderate conscious sedation.        Review of Systems:   Focused ROS performed, negative except as noted above.         Physical Examination:   BP (!) 163/83 (BP Location: Right arm, Patient Position: Sitting, Cuff Size: Adult Large)   Pulse 62   SpO2 94%     Gen: sitting upright,without oxygen, ambulating with walker  HEENT: atraumatic, EOMI  Oropharynx: no sinus tenderness   CV: RRR  Resp: no increased WOB, no crackles or wheezing   Abd: not distended, non-tender  Skin: no rashes or lesions on exposed skin  Extremities: non-pitting edema  Neuro: alert and oriented no FND         Data:   PFTs 12/20/23    CXR 10/3/23 IMPRESSION: Elevated right hemidiaphragm with mild right basilar atelectasis.      CT chest 8/13/21 IMPRESSION:   1. No acute findings in the chest.   2. Incidental finding of 1.7 cm indeterminate lesion off the right upper kidney.  May represent a complex or proteinaceous cyst however a solid lesion is not excluded.  Further evaluation with renal ultrasound is recommended.   3. Sub 6 mm pulmonary nodules.  Follow-up can be obtained per Fleischner society guidelines provided below.   4. Post cholecystectomy.   5. Ruptured left breast implant.     ECHO 4/26/2022 Summary:    * The estimated ejection fraction is 50-55%.     * Normal right ventricular systolic function.     * There is no aortic stenosis with a peak velocity of  142 cm/s.     * The IVC is normal in size (< 2.1 cm), < 50% respiratory variance, RA pressure elevated at 8 mmHg.      Sniff test 3/27/2021 in care everywhere- R diaphragm paralysis     Sleep study care everywhere 12/12/23. See report.        Medications:     Current Outpatient Medications   Medication Sig Dispense Refill     acetaminophen (TYLENOL) 500 MG tablet Take 500 mg by mouth       albuterol (PROAIR HFA;PROVENTIL HFA;VENTOLIN HFA) 90 mcg/actuation inhaler [ALBUTEROL (PROAIR HFA;PROVENTIL HFA;VENTOLIN HFA) 90 MCG/ACTUATION INHALER] Inhale 2 puffs every 6 (six) hours as needed for wheezing.       calcium-vitamin D 250-100 mg-unit per tablet [CALCIUM-VITAMIN D 250-100 MG-UNIT PER TABLET] Take 1 tablet by mouth 2 (two) times a day.       celecoxib (CELEBREX) 200 MG capsule Take 200 mg by mouth daily       cetirizine (ZYRTEC) 10 MG tablet Take 10 mg by mouth daily       clobetasol (TEMOVATE) 0.05 % external cream Apply topically 2 times daily       DULoxetine (CYMBALTA) 20 MG capsule [DULOXETINE (CYMBALTA) 20 MG CAPSULE] Take 20 mg by mouth daily.       famotidine (PEPCID) 20 MG tablet Take 20 mg by mouth 2 times daily       fluticasone (FLONASE) 50 MCG/ACT nasal spray Spray 1 spray into both nostrils daily       furosemide (LASIX) 20 MG tablet Take 20 mg by mouth daily        lidocaine (LIDODERM) 5 % [LIDOCAINE (LIDODERM) 5 %] Place 1-2 patches on the skin daily as needed. Remove & Discard patch within 12 hours or as directed by MD        metoprolol tartrate (LOPRESSOR) 25 MG tablet [METOPROLOL TARTRATE (LOPRESSOR) 25 MG TABLET] Take 25 mg by mouth 2 (two) times a day.       montelukast (SINGULAIR) 10 MG tablet Take 1 tablet (10 mg) by mouth at bedtime 90 tablet 3     omeprazole (PRILOSEC) 40 MG DR capsule Take 40 mg by mouth daily       oxyCODONE (ROXICODONE) 5 MG immediate release tablet [OXYCODONE (ROXICODONE) 5 MG IMMEDIATE RELEASE TABLET] Take by mouth.       triamcinolone (KENALOG) 0.1 % cream [TRIAMCINOLONE (KENALOG) 0.1 % CREAM] Apply topically 2 (two) times a day as needed.       calcium citrate (CALCITRATE) 200 mg (950 mg) tablet [CALCIUM CITRATE (CALCITRATE) 200 MG (950 MG) TABLET] Take 1 tablet (200 mg total) by mouth 2 (two) times a day. (Patient not taking: Reported on 10/4/2023) 200 tablet 4     collagenase ointment [COLLAGENASE OINTMENT] Apply topically. (Patient not taking: Reported on 10/4/2023)       ergocalciferol (VITAMIN D2) 50,000 unit capsule [ERGOCALCIFEROL (VITAMIN D2) 50,000 UNIT CAPSULE] Take 1 capsule (50,000 Units total) by mouth once a week. (Patient not taking: Reported on 10/4/2023) 12 capsule 4     fexofenadine (ALLEGRA) 180 MG tablet [FEXOFENADINE (ALLEGRA) 180 MG TABLET] Take 180 mg by mouth daily as needed. (Patient not taking: Reported on 10/4/2023)       hydrOXYzine (VISTARIL) 50 MG capsule [HYDROXYZINE (VISTARIL) 50 MG CAPSULE] Take by mouth. (Patient not taking: Reported on 10/4/2023)       metoprolol tartrate (LOPRESSOR) 25 MG tablet [METOPROLOL TARTRATE (LOPRESSOR) 25 MG TABLET] Take 25 mg by mouth daily as needed (for rapid heart rate). (Patient not taking: Reported on 10/4/2023)       ranitidine (ZANTAC) 150 MG tablet [RANITIDINE (ZANTAC) 150 MG TABLET] Take 150 mg by mouth daily as needed for heartburn. (Patient not taking: Reported on  10/4/2023)       UNABLE TO FIND [UNABLE TO FIND] Administer 1 drop to both eyes as needed (itchy eyes). Med Name:  Similasan Allergy Eye Drop, this is the brand that works for patient (Patient not taking: Reported on 10/4/2023)       No current facility-administered medications for this visit.       Attestation signed by Jessica Turner MD at 7/13/2024 12:32 AM:  Physician Attestation   I, Jessica Turner MD, saw this patient with the fellow and agree with the fellow's findings and plan of care as documented in the note.      I personally reviewed vital signs, medications, relevant images, and labs    Patient at higher risk for pulmonary complications due to multiple comorbidities, R hemidiaphragmatic paralysis, obesity. Consider general anesthesia for procedures.     The patient was seen and examined with the fellow physician.  We have discussed the patient in detail and I agree with the findings, assessment, and plan as documented when this note was cosigned on this day.     Jessica Turner MD  Date of Service (when I saw the patient): 7/10/24      Again, thank you for allowing me to participate in the care of your patient.        Sincerely,        Marj Funes MD

## 2024-10-05 ENCOUNTER — HEALTH MAINTENANCE LETTER (OUTPATIENT)
Age: 74
End: 2024-10-05

## 2025-03-16 ENCOUNTER — HEALTH MAINTENANCE LETTER (OUTPATIENT)
Age: 75
End: 2025-03-16

## 2025-05-01 ENCOUNTER — TELEPHONE (OUTPATIENT)
Dept: PULMONOLOGY | Facility: CLINIC | Age: 75
End: 2025-05-01
Payer: OTHER GOVERNMENT

## 2025-05-01 NOTE — TELEPHONE ENCOUNTER
Left Voicemail (1st Attempt) for the patient to call back and schedule the following:    Appointment type:  RTN PULM   Provider: [facility]   Return date: 06/25/25  Specialty phone number: [phone] OPT 1   Additional appointment(s) needed: N/A   Additonal Notes: N/A

## 2025-07-20 ENCOUNTER — HEALTH MAINTENANCE LETTER (OUTPATIENT)
Age: 75
End: 2025-07-20